# Patient Record
Sex: MALE | Race: OTHER | HISPANIC OR LATINO | URBAN - METROPOLITAN AREA
[De-identification: names, ages, dates, MRNs, and addresses within clinical notes are randomized per-mention and may not be internally consistent; named-entity substitution may affect disease eponyms.]

---

## 2019-04-08 VITALS
WEIGHT: 166.01 LBS | OXYGEN SATURATION: 97 % | HEIGHT: 66 IN | HEART RATE: 64 BPM | RESPIRATION RATE: 16 BRPM | SYSTOLIC BLOOD PRESSURE: 181 MMHG | TEMPERATURE: 98 F | DIASTOLIC BLOOD PRESSURE: 88 MMHG

## 2019-04-08 RX ORDER — CHLORHEXIDINE GLUCONATE 213 G/1000ML
1 SOLUTION TOPICAL ONCE
Qty: 0 | Refills: 0 | Status: DISCONTINUED | OUTPATIENT
Start: 2019-04-09 | End: 2019-04-09

## 2019-04-08 NOTE — H&P ADULT - ASSESSMENT
80 yo male, PMHX HTN, Hyperlipidemia, PAD, BPH who presents today to [facility] for recommended cardiac catheterization with possible intervention if clinically indicated in light of pts risk factors and abnormal NST.    -ASA 3, Mallampati 3  -Pt compliant with home ASA 81mg, last dose yesterday. Will give pt home dose of ASA 81mg and load with Plavix 600mg  -IVF Hydration NS @ 75cc/hour    consent obtained via [facility]  # [id number]  Risks & benefits of procedure and alternative therapy have been explained to the patient including but not limited to: allergic reaction, bleeding w/possible need for blood transfusion, infection, renal and vascular compromise, limb damage, arrhythmia, stroke, vessel dissection/perforation, Myocardial infarction, emergent CABG. Informed consent obtained and in chart.

## 2019-04-08 NOTE — H&P ADULT - NSHPLABSRESULTS_GEN_ALL_CORE
13.4   6.58  )-----------( 205      ( 09 Apr 2019 12:22 )             41.0               PT/INR - ( 09 Apr 2019 12:22 )   PT: 12.4 sec;   INR: 1.09          PTT - ( 09 Apr 2019 12:22 )  PTT:31.6 sec              EKG: 13.4   6.58  )-----------( 205      ( 09 Apr 2019 12:22 )             41.0       04-09    141  |  104  |  16  ----------------------------<  106<H>  4.3   |  27  |  0.84    Ca    9.8      09 Apr 2019 12:22    TPro  7.1  /  Alb  4.5  /  TBili  0.3  /  DBili  x   /  AST  22  /  ALT  10  /  AlkPhos  58  04-09      PT/INR - ( 09 Apr 2019 12:22 )   PT: 12.4 sec;   INR: 1.09          PTT - ( 09 Apr 2019 12:22 )  PTT:31.6 sec    CARDIAC MARKERS ( 09 Apr 2019 12:22 )  x     / x     / 149 U/L / x     / 2.4 ng/mL      EKG: NSR, 65bpm, PVCs, no ST-T changes

## 2019-04-08 NOTE — H&P ADULT - HISTORY OF PRESENT ILLNESS
SKELETON    80 yo male, PMHX HTN, Hyperlipidemia, PAD, BPH presented to cardiologist endorsing right calf pain after walking 6-8 city blocks, relieved with starting Cilostazol. Denies chest pain, dyspnea, palpitations, orthopnea, or LE edema. NST on 3/12/19 showed small sized, mild intensity, partially reversible defect in the inferior wall suggestive of ischemia. EF 76%. Echocardiogram 10/17/18 Normal LV systolic function. EF 55-60%. No valve disease. LE arterial duplex on 1/23/19 showed ?50% stenosis of right prox SFA, popliteal, and prox AT. Occlusion of right posterior tibial artery. >50% stenosis of left mid and distal popliteal arteries and distal AT. Heavy irregular scattered calcifications. Right JESSICA 0.59. Left JESSICA 0.9. SKELETON    Dr Peters only wants to perform LHC and not peripheral angiogram on this admission    78 yo male, PMHX HTN, Hyperlipidemia, PAD, BPH presented to cardiologist endorsing right calf pain after walking 6-8 city blocks, relieved with starting Cilostazol. Denies chest pain, dyspnea, palpitations, orthopnea, or LE edema. NST on 3/12/19 showed small sized, mild intensity, partially reversible defect in the inferior wall suggestive of ischemia. EF 76%. Echocardiogram 10/17/18 Normal LV systolic function. EF 55-60%. No valve disease. LE arterial duplex on 1/23/19 showed >50% stenosis of right prox SFA, popliteal, and prox AT. Occlusion of right posterior tibial artery. >50% stenosis of left mid and distal popliteal arteries and distal AT. Heavy irregular scattered calcifications. Right JESSICA 0.59. Left JESSICA 0.9.     In light of pt's risk factors and abnormal NST pt presents for cardiac catheterization with possible intervention SKELETON    Dr Peters only wants to perform LHC and not peripheral angiogram on this admission    80 yo male, PMHX HTN, Hyperlipidemia, PAD, BPH who presented to cardiologist endorsing right calf pain after walking 6-8 city blocks, relieved rest and pt was started on Cilostazol. Denies chest pain, dyspnea, palpitations, orthopnea, or LE edema. NST on 3/12/19 showed small sized, mild intensity, partially reversible defect in the inferior wall suggestive of ischemia. EF 76%. Echocardiogram 10/17/18 Normal LV systolic function. EF 55-60%. No valve disease. LE arterial duplex on 1/23/19 showed >50% stenosis of right prox SFA, popliteal, and prox AT. Occlusion of right posterior tibial artery. >50% stenosis of left mid and distal popliteal arteries and distal AT. Heavy irregular scattered calcifications. Right JESSICA 0.59. Left JESSICA 0.9.     In light of pt's risk factors and abnormal NST pt presents for cardiac catheterization with possible intervention 80 yo Maltese speaking M, with FHx of MI and PMHX HTN, HLD, PAD, and BPH who presented to cardiologist endorsing worsening calf pain x 3-4 years. The pain is B/L, described as cramping that occurs after walking 6-8 city blocks, relieved rest. He reports "white spots" discoloration and LE edema but denies loss of hair or ulcers. He denies CP, palpitations, dizziness, syncope, diaphoresis, fatigue, SOB, PEREZ, orthopnea, PND, N/V, melena, hematochezia, fever or chills. The pt had a NST on (3/12/19) which showed small sized, mild intensity, partially reversible defect in the inferior wall suggestive of ischemia, EF 76%, and a LE arterial duplex (1/23/19) showing >50% stenosis of right prox SFA, popliteal, and prox AT, occlusion of right posterior tibial artery, >50% stenosis of left mid and distal popliteal arteries and distal AT, Heavy irregular scattered calcifications, Right JESSICA 0.59, Left JESSICA 0.9. He previously had an Echo (10/17/18) revealing EF 55-60%, no valve disease. Per Dr. Peters pt requires further workup for CAD first, prior to his PAD.    In light of pt's risk factors and abnormal NST pt presents for cardiac catheterization with possible intervention if clinically indicated.

## 2019-04-08 NOTE — H&P ADULT - NSICDXPASTMEDICALHX_GEN_ALL_CORE_FT
PAST MEDICAL HISTORY:  BPH (benign prostatic hyperplasia)     Hyperlipidemia     Hypertension PAST MEDICAL HISTORY:  BPH (benign prostatic hyperplasia)     Hyperlipidemia     Hypertension     PAD (peripheral artery disease)

## 2019-04-09 ENCOUNTER — OUTPATIENT (OUTPATIENT)
Dept: OUTPATIENT SERVICES | Facility: HOSPITAL | Age: 79
LOS: 1 days | Discharge: NURSING, REHAB OR LONG-TERM CARE | End: 2019-04-09
Payer: MEDICARE

## 2019-04-09 LAB
ALBUMIN SERPL ELPH-MCNC: 4.5 G/DL — SIGNIFICANT CHANGE UP (ref 3.3–5)
ALP SERPL-CCNC: 58 U/L — SIGNIFICANT CHANGE UP (ref 40–120)
ALT FLD-CCNC: 10 U/L — SIGNIFICANT CHANGE UP (ref 10–45)
ANION GAP SERPL CALC-SCNC: 10 MMOL/L — SIGNIFICANT CHANGE UP (ref 5–17)
APTT BLD: 31.6 SEC — SIGNIFICANT CHANGE UP (ref 27.5–36.3)
AST SERPL-CCNC: 22 U/L — SIGNIFICANT CHANGE UP (ref 10–40)
BASOPHILS # BLD AUTO: 0.01 K/UL — SIGNIFICANT CHANGE UP (ref 0–0.2)
BASOPHILS NFR BLD AUTO: 0.2 % — SIGNIFICANT CHANGE UP (ref 0–2)
BILIRUB SERPL-MCNC: 0.3 MG/DL — SIGNIFICANT CHANGE UP (ref 0.2–1.2)
BUN SERPL-MCNC: 16 MG/DL — SIGNIFICANT CHANGE UP (ref 7–23)
CALCIUM SERPL-MCNC: 9.8 MG/DL — SIGNIFICANT CHANGE UP (ref 8.4–10.5)
CHLORIDE SERPL-SCNC: 104 MMOL/L — SIGNIFICANT CHANGE UP (ref 96–108)
CHOLEST SERPL-MCNC: 129 MG/DL — SIGNIFICANT CHANGE UP (ref 10–199)
CK MB CFR SERPL CALC: 2.4 NG/ML — SIGNIFICANT CHANGE UP (ref 0–6.7)
CK SERPL-CCNC: 149 U/L — SIGNIFICANT CHANGE UP (ref 30–200)
CO2 SERPL-SCNC: 27 MMOL/L — SIGNIFICANT CHANGE UP (ref 22–31)
CREAT SERPL-MCNC: 0.84 MG/DL — SIGNIFICANT CHANGE UP (ref 0.5–1.3)
EOSINOPHIL # BLD AUTO: 0.03 K/UL — SIGNIFICANT CHANGE UP (ref 0–0.5)
EOSINOPHIL NFR BLD AUTO: 0.5 % — SIGNIFICANT CHANGE UP (ref 0–6)
GLUCOSE SERPL-MCNC: 106 MG/DL — HIGH (ref 70–99)
HBA1C BLD-MCNC: 5.9 % — HIGH (ref 4–5.6)
HCT VFR BLD CALC: 41 % — SIGNIFICANT CHANGE UP (ref 39–50)
HDLC SERPL-MCNC: 64 MG/DL — SIGNIFICANT CHANGE UP
HGB BLD-MCNC: 13.4 G/DL — SIGNIFICANT CHANGE UP (ref 13–17)
IMM GRANULOCYTES NFR BLD AUTO: 0.2 % — SIGNIFICANT CHANGE UP (ref 0–1.5)
INR BLD: 1.09 — SIGNIFICANT CHANGE UP (ref 0.88–1.16)
LIPID PNL WITH DIRECT LDL SERPL: 52 MG/DL — SIGNIFICANT CHANGE UP
LYMPHOCYTES # BLD AUTO: 2.71 K/UL — SIGNIFICANT CHANGE UP (ref 1–3.3)
LYMPHOCYTES # BLD AUTO: 41.2 % — SIGNIFICANT CHANGE UP (ref 13–44)
MCHC RBC-ENTMCNC: 30.5 PG — SIGNIFICANT CHANGE UP (ref 27–34)
MCHC RBC-ENTMCNC: 32.7 GM/DL — SIGNIFICANT CHANGE UP (ref 32–36)
MCV RBC AUTO: 93.2 FL — SIGNIFICANT CHANGE UP (ref 80–100)
MONOCYTES # BLD AUTO: 0.54 K/UL — SIGNIFICANT CHANGE UP (ref 0–0.9)
MONOCYTES NFR BLD AUTO: 8.2 % — SIGNIFICANT CHANGE UP (ref 2–14)
NEUTROPHILS # BLD AUTO: 3.28 K/UL — SIGNIFICANT CHANGE UP (ref 1.8–7.4)
NEUTROPHILS NFR BLD AUTO: 49.7 % — SIGNIFICANT CHANGE UP (ref 43–77)
NRBC # BLD: 0 /100 WBCS — SIGNIFICANT CHANGE UP (ref 0–0)
PLATELET # BLD AUTO: 205 K/UL — SIGNIFICANT CHANGE UP (ref 150–400)
POTASSIUM SERPL-MCNC: 4.3 MMOL/L — SIGNIFICANT CHANGE UP (ref 3.5–5.3)
POTASSIUM SERPL-SCNC: 4.3 MMOL/L — SIGNIFICANT CHANGE UP (ref 3.5–5.3)
PROT SERPL-MCNC: 7.1 G/DL — SIGNIFICANT CHANGE UP (ref 6–8.3)
PROTHROM AB SERPL-ACNC: 12.4 SEC — SIGNIFICANT CHANGE UP (ref 10–12.9)
RBC # BLD: 4.4 M/UL — SIGNIFICANT CHANGE UP (ref 4.2–5.8)
RBC # FLD: 14.1 % — SIGNIFICANT CHANGE UP (ref 10.3–14.5)
SODIUM SERPL-SCNC: 141 MMOL/L — SIGNIFICANT CHANGE UP (ref 135–145)
TOTAL CHOLESTEROL/HDL RATIO MEASUREMENT: 2 RATIO — LOW (ref 3.4–9.6)
TRIGL SERPL-MCNC: 64 MG/DL — SIGNIFICANT CHANGE UP (ref 10–149)
WBC # BLD: 6.58 K/UL — SIGNIFICANT CHANGE UP (ref 3.8–10.5)
WBC # FLD AUTO: 6.58 K/UL — SIGNIFICANT CHANGE UP (ref 3.8–10.5)

## 2019-04-09 PROCEDURE — 82553 CREATINE MB FRACTION: CPT

## 2019-04-09 PROCEDURE — 93010 ELECTROCARDIOGRAM REPORT: CPT

## 2019-04-09 PROCEDURE — 93458 L HRT ARTERY/VENTRICLE ANGIO: CPT | Mod: 26

## 2019-04-09 PROCEDURE — 93005 ELECTROCARDIOGRAM TRACING: CPT

## 2019-04-09 PROCEDURE — 80053 COMPREHEN METABOLIC PANEL: CPT

## 2019-04-09 PROCEDURE — 82550 ASSAY OF CK (CPK): CPT

## 2019-04-09 PROCEDURE — 93458 L HRT ARTERY/VENTRICLE ANGIO: CPT

## 2019-04-09 PROCEDURE — 83036 HEMOGLOBIN GLYCOSYLATED A1C: CPT

## 2019-04-09 PROCEDURE — 80061 LIPID PANEL: CPT

## 2019-04-09 PROCEDURE — 85610 PROTHROMBIN TIME: CPT

## 2019-04-09 PROCEDURE — 85025 COMPLETE CBC W/AUTO DIFF WBC: CPT

## 2019-04-09 PROCEDURE — C1894: CPT

## 2019-04-09 PROCEDURE — 85730 THROMBOPLASTIN TIME PARTIAL: CPT

## 2019-04-09 PROCEDURE — 36415 COLL VENOUS BLD VENIPUNCTURE: CPT

## 2019-04-09 PROCEDURE — C1769: CPT

## 2019-04-09 PROCEDURE — C1887: CPT

## 2019-04-09 RX ORDER — CLOPIDOGREL BISULFATE 75 MG/1
600 TABLET, FILM COATED ORAL ONCE
Qty: 0 | Refills: 0 | Status: COMPLETED | OUTPATIENT
Start: 2019-04-09 | End: 2019-04-09

## 2019-04-09 RX ORDER — SODIUM CHLORIDE 9 MG/ML
500 INJECTION INTRAMUSCULAR; INTRAVENOUS; SUBCUTANEOUS
Qty: 0 | Refills: 0 | Status: DISCONTINUED | OUTPATIENT
Start: 2019-04-09 | End: 2019-04-09

## 2019-04-09 RX ORDER — ROSUVASTATIN CALCIUM 5 MG/1
1 TABLET ORAL
Qty: 0 | Refills: 0 | COMMUNITY

## 2019-04-09 RX ORDER — TAMSULOSIN HYDROCHLORIDE 0.4 MG/1
1 CAPSULE ORAL
Qty: 0 | Refills: 0 | COMMUNITY

## 2019-04-09 RX ORDER — METOPROLOL TARTRATE 50 MG
1 TABLET ORAL
Qty: 60 | Refills: 2 | OUTPATIENT
Start: 2019-04-09 | End: 2019-07-07

## 2019-04-09 RX ORDER — AMLODIPINE BESYLATE 2.5 MG/1
1 TABLET ORAL
Qty: 0 | Refills: 0 | COMMUNITY

## 2019-04-09 RX ORDER — CILOSTAZOL 100 MG/1
1 TABLET ORAL
Qty: 0 | Refills: 0 | COMMUNITY

## 2019-04-09 RX ORDER — ASPIRIN/CALCIUM CARB/MAGNESIUM 324 MG
1 TABLET ORAL
Qty: 0 | Refills: 0 | COMMUNITY

## 2019-04-09 RX ORDER — ASPIRIN/CALCIUM CARB/MAGNESIUM 324 MG
81 TABLET ORAL ONCE
Qty: 0 | Refills: 0 | Status: COMPLETED | OUTPATIENT
Start: 2019-04-09 | End: 2019-04-09

## 2019-04-09 RX ADMIN — CLOPIDOGREL BISULFATE 600 MILLIGRAM(S): 75 TABLET, FILM COATED ORAL at 12:45

## 2019-04-09 RX ADMIN — Medication 81 MILLIGRAM(S): at 12:45

## 2019-04-09 RX ADMIN — SODIUM CHLORIDE 75 MILLILITER(S): 9 INJECTION INTRAMUSCULAR; INTRAVENOUS; SUBCUTANEOUS at 12:46

## 2019-04-09 NOTE — PROGRESS NOTE ADULT - SUBJECTIVE AND OBJECTIVE BOX
Interventional Cardiology PA SDA Discharge Note    Patient without complaints.   Afebrile, VSS    Ext:    		  		Right    Radial :    hematoma,     bleeding, dressing; C/D/I      Pulses:    intact RAD to baseline     A/P:    78 yo male, PMHX HTN, Hyperlipidemia, PAD, BPH who presents today to [facility] for recommended cardiac catheterization with possible intervention if clinically indicated in light of pts risk factors and abnormal NST. Pt now s/p diagnostic cardiac catheterization showing....  Right radial access.                 1.	Stable for discharge as per attending Dr. Peters after bed rest, pt voids, wrist stable and 30 minutes of ambulation.  2.	Follow-up with PMD/Cardiologist Dr. Jc in 1-2 weeks  3.	Discharged forms signed and copies in chart Interventional Cardiology PA SDA Discharge Note    Patient without complaints.   Afebrile, VSS    Ext:    		  		Right    Radial :  no  hematoma,  no   bleeding, dressing; C/D/I      Pulses:    intact RAD to baseline     A/P:    78 yo male, PMHX HTN, Hyperlipidemia, PAD, BPH who presents today to [facility] for recommended cardiac catheterization with possible intervention if clinically indicated in light of pts risk factors and abnormal NST. Pt now s/p diagnostic cardiac catheterizationLMCA: widely patent, LAD: mid LAD with severe myocardial bridge, nitroglycerin given that worsened the appearance.  LCx: mild diffuse disease.  RCA: proximal 100%  occlusion of RCA, RCA fills via septal collaterals from the LAD and epicardial collaterals from the LCx. LVEDP: 6 mmHg, LVEF: 55%, no Aortic stenosis/mitral regurgitation.  Right radial access. Optimization of medical management with continued aspirin 81 mg and high dose statin. Started on Metoprolol tartrate 25 mg BID. Pt will follow up up in office of continued PAD workup                1.	Stable for discharge as per attending Dr. Peters after bed rest, pt voids, wrist stable and 30 minutes of ambulation.  2.	Follow-up with PMD/Cardiologist Dr. Jc in 1-2 weeks  3.	Discharged forms signed and copies in chart

## 2020-09-04 NOTE — H&P ADULT - RS GEN PE MLT RESP DETAILS PC
Patient
no wheezes/no rales/airway patent/respirations non-labored/breath sounds equal/good air movement/no rhonchi